# Patient Record
Sex: MALE | Race: WHITE | NOT HISPANIC OR LATINO | URBAN - NONMETROPOLITAN AREA
[De-identification: names, ages, dates, MRNs, and addresses within clinical notes are randomized per-mention and may not be internally consistent; named-entity substitution may affect disease eponyms.]

---

## 2017-09-25 PROBLEM — Z98.890: Noted: 2020-03-04

## 2017-09-25 PROBLEM — H04.123: Noted: 2017-09-25

## 2017-09-25 PROBLEM — H52.4: Noted: 2017-09-25

## 2017-09-25 PROBLEM — Z96.1: Noted: 2020-02-19

## 2017-09-25 PROBLEM — H43.812: Noted: 2017-09-25

## 2017-09-25 PROBLEM — H26.493: Noted: 2020-02-19

## 2017-09-25 PROBLEM — Z96.1: Noted: 2017-09-25

## 2017-09-25 PROBLEM — H16.223: Noted: 2020-03-04

## 2017-09-25 PROBLEM — H26.493: Noted: 2017-09-25

## 2017-09-25 PROBLEM — H26.492: Noted: 2020-02-25

## 2019-09-30 ENCOUNTER — IMPORTED ENCOUNTER (OUTPATIENT)
Dept: URBAN - NONMETROPOLITAN AREA CLINIC 1 | Facility: CLINIC | Age: 64
End: 2019-09-30

## 2019-09-30 PROCEDURE — 99214 OFFICE O/P EST MOD 30 MIN: CPT

## 2019-09-30 NOTE — PATIENT DISCUSSION
Pseudophakia OU Discussed diagnosis in detail with patientBoth intraocular implants in place today Moderate PCO noted OD>OS. Recommend refer to Dr. Son Mercado for evaluation. Patient elects to schedule evaluation. PVD OSDiscussed findings of exam in detail with the patientThe risk of retinal detachment in patients with PVDs was discussed with the patient and the warning signs of retinal detachment were carefully reviewed with the patientThe patient was warned to return to the office or contact the ophthalmologist on call immediately if they experience signs of retinal detachmentContinue to monitorDES OUDiscussed diagnosis in detail with patientDiscussed the chronic nature and treatment options with the patientContinue Refresh PRNContinue to monitorPresbyopia OUDiscussed refractive status in detail with patientContinue to monitor; 's Notes: MR 9/28/18DFE 9/30/19[facility] 8/22/16

## 2020-02-19 ENCOUNTER — IMPORTED ENCOUNTER (OUTPATIENT)
Dept: URBAN - NONMETROPOLITAN AREA CLINIC 1 | Facility: CLINIC | Age: 65
End: 2020-02-19

## 2020-02-19 PROCEDURE — 99214 OFFICE O/P EST MOD 30 MIN: CPT

## 2020-02-19 PROCEDURE — 66821 AFTER CATARACT LASER SURGERY: CPT

## 2020-02-19 NOTE — PATIENT DISCUSSION
PCO-Explained PCO and RBAs of YAG Capsulotomy to pt. -Pt elects to proceed. YAG Caps OD today and YAG Caps OS in 1-2 weeks. -Written consent obtained prior to procedure; 's Notes: MR 9/28/18DFE 9/30/19[facility] 8/22/16

## 2020-02-25 ENCOUNTER — IMPORTED ENCOUNTER (OUTPATIENT)
Dept: URBAN - NONMETROPOLITAN AREA CLINIC 1 | Facility: CLINIC | Age: 65
End: 2020-02-25

## 2020-02-25 PROCEDURE — 66821 AFTER CATARACT LASER SURGERY: CPT

## 2020-02-25 NOTE — PATIENT DISCUSSION
PCO-Explained PCO and RBAs of YAG Capsulotomy to pt. -Pt elects to proceed. YAG Caps OS today and POV in 1-2 weeks.  -Written consent obtained prior to procedure; 's Notes: MR 9/28/18DFE 9/30/19[facility] 8/22/16

## 2020-03-04 ENCOUNTER — IMPORTED ENCOUNTER (OUTPATIENT)
Dept: URBAN - NONMETROPOLITAN AREA CLINIC 1 | Facility: CLINIC | Age: 65
End: 2020-03-04

## 2020-03-04 PROCEDURE — 99024 POSTOP FOLLOW-UP VISIT: CPT

## 2020-03-04 NOTE — PATIENT DISCUSSION
"Pseudophakia - Discussed diagnosis in detail with patient- S/P YAG PC OU - Good central opening - MR done today by Dr. Danny Mina and new glasses RX given- Patient states that Dr. Severa Rua use to lay him diwn and would look at his retinas and states that no one has ever done that with him since Dr. Severa Rua.  Explained to patient that we have different lenses and photos that we use now. "" Would like for Dr. Reesa Sicard to look at him in dept at next visit"" - Continue to monitor- RTC 9 months Complete; Dr's Notes: MR 9/28/18DFE 9/30/19[facility] 8/22/16"

## 2020-11-02 ENCOUNTER — PREPPED CHART (OUTPATIENT)
Dept: URBAN - NONMETROPOLITAN AREA CLINIC 1 | Facility: CLINIC | Age: 65
End: 2020-11-02

## 2020-11-02 ENCOUNTER — IMPORTED ENCOUNTER (OUTPATIENT)
Dept: URBAN - NONMETROPOLITAN AREA CLINIC 1 | Facility: CLINIC | Age: 65
End: 2020-11-02

## 2020-11-02 PROBLEM — Z96.1: Noted: 2020-11-02

## 2020-11-02 PROBLEM — H16.223: Noted: 2020-11-02

## 2020-11-02 PROBLEM — D31.32: Noted: 2020-11-02

## 2020-11-02 PROBLEM — H43.813: Noted: 2020-11-02

## 2020-11-02 PROBLEM — H52.4: Noted: 2020-11-02

## 2020-11-02 PROCEDURE — 99214 OFFICE O/P EST MOD 30 MIN: CPT

## 2020-11-02 NOTE — PATIENT DISCUSSION
Discussed findings of exam in detail with the patient. The risk of retinal detachment in patients with PVDs was discussed with the patient, and the warning signs of retinal detachment were carefully reviewed with the patient. The patient was warned to return to the office, or contact the ophthalmologist on call, immediately if they experience signs of retinal detachment or changes in vision noted from today. Continue to monitor.

## 2020-11-02 NOTE — PATIENT DISCUSSION
Discussed diagnosis in detail with patient. Both intraocular implants in place and stable with open capsules OU. Continue to monitor.

## 2020-11-02 NOTE — PATIENT DISCUSSION
Discussed diagnosis in detail with patient. Appears stable on dilated exam today. Continue to monitor.

## 2020-11-02 NOTE — PATIENT DISCUSSION
P/C IOL OUDiscussed diagnosis in detail with patient. Both intraocular implants in place and stable with open capsules OU. Continue to monitor. PVD OUDiscussed findings of exam in detail with the patient. The risk of retinal detachment in patients with PVDs was discussed with the patient and the warning signs of retinal detachment were carefully reviewed with the patient. The patient was warned to return to the office or contact the ophthalmologist on call immediately if they experience signs of retinal detachment. Continue to monitor. MANPREET OUDiscussed the diagnosis with patient. Discussed the chronic nature and treatment options with the patient. Continue Refresh PRN. Continue to monitor. Chor Nevus OSDiscussed diagnosis in detail with patient. Appears stable on dilated exam today. Continue to monitor. RTC in 1 year with OptosPresbyopia OUDiscussed refractive status in detail with patient. New glasses Rx given today. Continue to monitor.; 's Notes: MR  11/2/20DFE  11/2/20[facility] 8/22/16

## 2020-11-02 NOTE — PATIENT DISCUSSION
Discussed the diagnosis with patient. Discussed the chronic nature and treatment options with the patient. Continue Refresh PRN. Continue to monitor.

## 2022-01-27 ASSESSMENT — VISUAL ACUITY
OS_CC: 20/20
OD_CC: 20/22

## 2022-01-27 ASSESSMENT — TONOMETRY
OS_IOP_MMHG: 14
OD_IOP_MMHG: 14

## 2022-01-28 ENCOUNTER — FOLLOW UP (OUTPATIENT)
Dept: URBAN - NONMETROPOLITAN AREA CLINIC 1 | Facility: CLINIC | Age: 67
End: 2022-01-28

## 2022-01-28 DIAGNOSIS — D31.32: ICD-10-CM

## 2022-01-28 DIAGNOSIS — H52.4: ICD-10-CM

## 2022-01-28 PROCEDURE — 92015 DETERMINE REFRACTIVE STATE: CPT

## 2022-01-28 PROCEDURE — 92250 FUNDUS PHOTOGRAPHY W/I&R: CPT

## 2022-01-28 PROCEDURE — 99213 OFFICE O/P EST LOW 20 MIN: CPT

## 2022-01-28 ASSESSMENT — TONOMETRY
OD_IOP_MMHG: 14
OS_IOP_MMHG: 14

## 2022-01-28 ASSESSMENT — VISUAL ACUITY: OD_CC: 20/20

## 2022-04-09 ASSESSMENT — VISUAL ACUITY
OD_SC: 20/50+
OD_SC: 20/40
OS_SC: 20/20
OD_GLARE: 20/100+
OS_GLARE: 20/40
OD_PH: 20/30
OS_SC: 20/25-1
OS_CC: 20/32-2
OD_PH: 20/30
OS_SC: 20/40
OS_SC: 20/20
OD_SC: 20/22
OS_GLARE: 20/40
OD_SC: 20/70
OD_CC: 20/29-2
OS_PH: 20/30

## 2022-04-09 ASSESSMENT — TONOMETRY
OS_IOP_MMHG: 14
OS_IOP_MMHG: 16
OS_IOP_MMHG: 14
OD_IOP_MMHG: 14
OD_IOP_MMHG: 14
OS_IOP_MMHG: 14
OD_IOP_MMHG: 16
OD_IOP_MMHG: 14
OS_IOP_MMHG: 16
OD_IOP_MMHG: 15

## 2023-01-31 ENCOUNTER — COMPREHENSIVE EXAM (OUTPATIENT)
Dept: URBAN - NONMETROPOLITAN AREA CLINIC 1 | Facility: CLINIC | Age: 68
End: 2023-01-31

## 2023-01-31 DIAGNOSIS — H52.4: ICD-10-CM

## 2023-01-31 DIAGNOSIS — D31.32: ICD-10-CM

## 2023-01-31 PROCEDURE — 92015 DETERMINE REFRACTIVE STATE: CPT

## 2023-01-31 PROCEDURE — 99214 OFFICE O/P EST MOD 30 MIN: CPT

## 2023-01-31 ASSESSMENT — VISUAL ACUITY
OD_CC: 20/20
OS_CC: 20/22

## 2023-01-31 ASSESSMENT — TONOMETRY
OS_IOP_MMHG: 15
OD_IOP_MMHG: 14

## 2024-02-01 ENCOUNTER — COMPREHENSIVE EXAM (OUTPATIENT)
Dept: URBAN - NONMETROPOLITAN AREA CLINIC 1 | Facility: CLINIC | Age: 69
End: 2024-02-01

## 2024-02-01 DIAGNOSIS — D31.32: ICD-10-CM

## 2024-02-01 DIAGNOSIS — Z96.1: ICD-10-CM

## 2024-02-01 DIAGNOSIS — H16.223: ICD-10-CM

## 2024-02-01 DIAGNOSIS — H52.4: ICD-10-CM

## 2024-02-01 PROCEDURE — 92015 DETERMINE REFRACTIVE STATE: CPT

## 2024-02-01 PROCEDURE — 99213 OFFICE O/P EST LOW 20 MIN: CPT

## 2024-02-01 ASSESSMENT — TONOMETRY
OD_IOP_MMHG: 15
OS_IOP_MMHG: 15

## 2024-02-01 ASSESSMENT — VISUAL ACUITY
OD_CC: 20/20-1
OS_CC: 20/20-1

## 2025-02-04 ENCOUNTER — FOLLOW UP (OUTPATIENT)
Age: 70
End: 2025-02-04

## 2025-02-04 DIAGNOSIS — H52.4: ICD-10-CM

## 2025-02-04 DIAGNOSIS — D31.32: ICD-10-CM

## 2025-02-04 DIAGNOSIS — H16.223: ICD-10-CM

## 2025-02-04 DIAGNOSIS — H43.813: ICD-10-CM

## 2025-02-04 PROCEDURE — 92014 COMPRE OPH EXAM EST PT 1/>: CPT

## 2025-02-04 PROCEDURE — 92015 DETERMINE REFRACTIVE STATE: CPT
